# Patient Record
Sex: FEMALE | Race: WHITE | URBAN - METROPOLITAN AREA
[De-identification: names, ages, dates, MRNs, and addresses within clinical notes are randomized per-mention and may not be internally consistent; named-entity substitution may affect disease eponyms.]

---

## 2017-02-09 RX ORDER — LOSARTAN POTASSIUM 50 MG/1
50 TABLET ORAL DAILY
Qty: 30 TABLET | Refills: 6 | Status: SHIPPED | OUTPATIENT
Start: 2017-02-09 | End: 2017-03-14 | Stop reason: SDUPTHER

## 2017-02-22 RX ORDER — HYDROCHLOROTHIAZIDE 25 MG/1
TABLET ORAL
Qty: 30 TABLET | Refills: 5 | Status: SHIPPED | OUTPATIENT
Start: 2017-02-22 | End: 2017-04-19

## 2017-03-14 RX ORDER — LOSARTAN POTASSIUM 50 MG/1
50 TABLET ORAL DAILY
Qty: 30 TABLET | Refills: 6 | Status: SHIPPED | OUTPATIENT
Start: 2017-03-14 | End: 2017-03-16 | Stop reason: SDUPTHER

## 2017-03-16 ENCOUNTER — TELEPHONE (OUTPATIENT)
Dept: CARDIOLOGY CLINIC | Age: 82
End: 2017-03-16

## 2017-03-16 RX ORDER — LOSARTAN POTASSIUM 50 MG/1
50 TABLET ORAL DAILY
Qty: 90 TABLET | Refills: 3 | Status: SHIPPED | OUTPATIENT
Start: 2017-03-16 | End: 2017-03-17 | Stop reason: SDUPTHER

## 2017-03-17 RX ORDER — LOSARTAN POTASSIUM 50 MG/1
50 TABLET ORAL DAILY
Qty: 90 TABLET | Refills: 3 | Status: SHIPPED | OUTPATIENT
Start: 2017-03-17 | End: 2017-04-19 | Stop reason: SDUPTHER

## 2017-04-12 RX ORDER — PRAVASTATIN SODIUM 40 MG
TABLET ORAL
Qty: 30 TABLET | Refills: 6 | Status: SHIPPED | OUTPATIENT
Start: 2017-04-12 | End: 2017-04-19

## 2017-04-12 RX ORDER — ATENOLOL 100 MG/1
TABLET ORAL
Qty: 30 TABLET | Refills: 6 | Status: SHIPPED | OUTPATIENT
Start: 2017-04-12 | End: 2018-07-25 | Stop reason: SDUPTHER

## 2017-04-19 ENCOUNTER — OFFICE VISIT (OUTPATIENT)
Dept: CARDIOLOGY CLINIC | Age: 82
End: 2017-04-19

## 2017-04-19 VITALS
SYSTOLIC BLOOD PRESSURE: 128 MMHG | WEIGHT: 108 LBS | HEIGHT: 63 IN | BODY MASS INDEX: 19.14 KG/M2 | DIASTOLIC BLOOD PRESSURE: 72 MMHG | HEART RATE: 62 BPM

## 2017-04-19 DIAGNOSIS — E78.49 OTHER HYPERLIPIDEMIA: Primary | ICD-10-CM

## 2017-04-19 DIAGNOSIS — I25.10 ATHEROSCLEROSIS OF NATIVE CORONARY ARTERY OF NATIVE HEART WITHOUT ANGINA PECTORIS: ICD-10-CM

## 2017-04-19 DIAGNOSIS — I65.23 BILATERAL CAROTID ARTERY STENOSIS: ICD-10-CM

## 2017-04-19 DIAGNOSIS — I10 ESSENTIAL HYPERTENSION, BENIGN: ICD-10-CM

## 2017-04-19 DIAGNOSIS — R07.9 CHEST PAIN, UNSPECIFIED TYPE: ICD-10-CM

## 2017-04-19 PROCEDURE — 99214 OFFICE O/P EST MOD 30 MIN: CPT | Performed by: INTERNAL MEDICINE

## 2017-04-19 RX ORDER — DONEPEZIL HYDROCHLORIDE 10 MG/1
10 TABLET, FILM COATED ORAL NIGHTLY
COMMUNITY

## 2017-04-19 RX ORDER — LOSARTAN POTASSIUM 25 MG/1
25 TABLET ORAL DAILY
Qty: 90 TABLET | Refills: 3 | Status: SHIPPED | OUTPATIENT
Start: 2017-04-19 | End: 2018-07-25 | Stop reason: SDUPTHER

## 2017-04-19 RX ORDER — NITROGLYCERIN 0.4 MG/1
0.4 TABLET SUBLINGUAL EVERY 5 MIN PRN
Qty: 25 TABLET | Refills: 1 | Status: SHIPPED | OUTPATIENT
Start: 2017-04-19

## 2018-01-08 ENCOUNTER — OFFICE VISIT (OUTPATIENT)
Dept: CARDIOLOGY CLINIC | Age: 83
End: 2018-01-08

## 2018-01-08 VITALS
WEIGHT: 107 LBS | DIASTOLIC BLOOD PRESSURE: 72 MMHG | BODY MASS INDEX: 19.69 KG/M2 | SYSTOLIC BLOOD PRESSURE: 132 MMHG | HEART RATE: 66 BPM | HEIGHT: 62 IN

## 2018-01-08 DIAGNOSIS — E78.49 OTHER HYPERLIPIDEMIA: ICD-10-CM

## 2018-01-08 DIAGNOSIS — I10 ESSENTIAL HYPERTENSION, BENIGN: Primary | ICD-10-CM

## 2018-01-08 DIAGNOSIS — I25.10 ATHEROSCLEROSIS OF NATIVE CORONARY ARTERY OF NATIVE HEART WITHOUT ANGINA PECTORIS: ICD-10-CM

## 2018-01-08 PROCEDURE — G8598 ASA/ANTIPLAT THER USED: HCPCS | Performed by: INTERNAL MEDICINE

## 2018-01-08 PROCEDURE — G8484 FLU IMMUNIZE NO ADMIN: HCPCS | Performed by: INTERNAL MEDICINE

## 2018-01-08 PROCEDURE — 99214 OFFICE O/P EST MOD 30 MIN: CPT | Performed by: INTERNAL MEDICINE

## 2018-01-08 PROCEDURE — 4040F PNEUMOC VAC/ADMIN/RCVD: CPT | Performed by: INTERNAL MEDICINE

## 2018-01-08 PROCEDURE — 93000 ELECTROCARDIOGRAM COMPLETE: CPT | Performed by: INTERNAL MEDICINE

## 2018-01-08 PROCEDURE — 1123F ACP DISCUSS/DSCN MKR DOCD: CPT | Performed by: INTERNAL MEDICINE

## 2018-01-08 PROCEDURE — 1090F PRES/ABSN URINE INCON ASSESS: CPT | Performed by: INTERNAL MEDICINE

## 2018-01-08 PROCEDURE — G8420 CALC BMI NORM PARAMETERS: HCPCS | Performed by: INTERNAL MEDICINE

## 2018-01-08 PROCEDURE — 1036F TOBACCO NON-USER: CPT | Performed by: INTERNAL MEDICINE

## 2018-01-08 PROCEDURE — G8427 DOCREV CUR MEDS BY ELIG CLIN: HCPCS | Performed by: INTERNAL MEDICINE

## 2018-01-08 NOTE — LETTER
has a past medical history of CAD (coronary artery disease); Hyperlipidemia; Hypertension; and Thyroid disease. Parkinsons disease without tremor    Surgical History:   has a past surgical history that includes bladder repair; Hysterectomy; Cholecystectomy; Foot surgery; and Rectocele repair. Social History:  History     Social History    Marital Status:       Spouse Name: N/A     Number of Children: N/A    Years of Education: N/A     Occupational History    Not on file. Social History Main Topics    Smoking status: Never Smoker     Smokeless tobacco: Not on file    Alcohol Use: No    Drug Use: No    Sexually Active:            Family History:  family history is not on file. Allergies:  Review of patient's allergies indicates no known allergies. Review of Systems:   · Constitutional: there has been no unanticipated weight loss. No change in energy or activity level   · Eyes: No visual changes   · ENT: No Headaches, hearing loss or vertigo. No mouth sores or sore throat. · Cardiovascular: Reviewed in HPI  · Respiratory: No cough or wheezing, no sputum production. · Gastrointestinal: No abdominal pain, appetite loss, blood in stools. · Genitourinary: No nocturia, dysuria, trouble voiding  · Musculoskeletal:  No gait disturbance, weakness or joint complaints. · Integumentary: No rash or pruritis. · Neurological: No headache, change in muscle strength, numbness or tingling. No change in gait, balance, coordination, mood, affect, memory, mentation, behavior. + dizziness  · Psychiatric: No anxiety or depression  · Endocrine: No malaise or fever  · Hematologic/Lymphatic: No abnormal bruising or bleeding, blood clots or swollen lymph nodes. · Allergic/Immunologic: No nasal congestion or hives.     Physical Examination:    Vitals:    01/08/18 1350   BP: 132/72   Site: Left Arm   Position: Sitting   Cuff Size: Medium Adult   Pulse: 66   Weight: 107 lb (48.5 kg)

## 2018-01-17 NOTE — COMMUNICATION BODY
Topics    Smoking status: Never Smoker     Smokeless tobacco: Not on file    Alcohol Use: No    Drug Use: No    Sexually Active:            Family History:  family history is not on file. Allergies:  Review of patient's allergies indicates no known allergies. Review of Systems:   · Constitutional: there has been no unanticipated weight loss. No change in energy or activity level   · Eyes: No visual changes   · ENT: No Headaches, hearing loss or vertigo. No mouth sores or sore throat. · Cardiovascular: Reviewed in HPI  · Respiratory: No cough or wheezing, no sputum production. · Gastrointestinal: No abdominal pain, appetite loss, blood in stools. · Genitourinary: No nocturia, dysuria, trouble voiding  · Musculoskeletal:  No gait disturbance, weakness or joint complaints. · Integumentary: No rash or pruritis. · Neurological: No headache, change in muscle strength, numbness or tingling. No change in gait, balance, coordination, mood, affect, memory, mentation, behavior. + dizziness  · Psychiatric: No anxiety or depression  · Endocrine: No malaise or fever  · Hematologic/Lymphatic: No abnormal bruising or bleeding, blood clots or swollen lymph nodes. · Allergic/Immunologic: No nasal congestion or hives.     Physical Examination:    Vitals:    01/08/18 1350   BP: 132/72   Site: Left Arm   Position: Sitting   Cuff Size: Medium Adult   Pulse: 66   Weight: 107 lb (48.5 kg)   Height: 5' 2\" (1.575 m)     Wt Readings from Last 3 Encounters:   01/08/18 107 lb (48.5 kg)   04/19/17 108 lb (49 kg)   10/03/16 110 lb (49.9 kg)     BP Readings from Last 3 Encounters:   01/08/18 132/72   04/19/17 128/72   10/03/16 160/74     Constitutional and General Appearance:  appears stated age  Respiratory:  · Normal excursion and expansion without use of accessory muscles  · Resp Auscultation: Normal breath sounds without dullness  Cardiovascular:  · The apical impulses not displaced  · Heart is regular rate and rhythm

## 2018-07-25 ENCOUNTER — OFFICE VISIT (OUTPATIENT)
Dept: CARDIOLOGY CLINIC | Age: 83
End: 2018-07-25

## 2018-07-25 VITALS
HEIGHT: 62 IN | HEART RATE: 68 BPM | SYSTOLIC BLOOD PRESSURE: 180 MMHG | WEIGHT: 104 LBS | DIASTOLIC BLOOD PRESSURE: 70 MMHG | BODY MASS INDEX: 19.14 KG/M2

## 2018-07-25 DIAGNOSIS — E78.49 OTHER HYPERLIPIDEMIA: ICD-10-CM

## 2018-07-25 DIAGNOSIS — I25.10 ATHEROSCLEROSIS OF NATIVE CORONARY ARTERY OF NATIVE HEART WITHOUT ANGINA PECTORIS: Primary | ICD-10-CM

## 2018-07-25 DIAGNOSIS — I10 ESSENTIAL HYPERTENSION, BENIGN: ICD-10-CM

## 2018-07-25 PROCEDURE — G8427 DOCREV CUR MEDS BY ELIG CLIN: HCPCS | Performed by: INTERNAL MEDICINE

## 2018-07-25 PROCEDURE — G8420 CALC BMI NORM PARAMETERS: HCPCS | Performed by: INTERNAL MEDICINE

## 2018-07-25 PROCEDURE — 99214 OFFICE O/P EST MOD 30 MIN: CPT | Performed by: INTERNAL MEDICINE

## 2018-07-25 PROCEDURE — 1101F PT FALLS ASSESS-DOCD LE1/YR: CPT | Performed by: INTERNAL MEDICINE

## 2018-07-25 PROCEDURE — 1036F TOBACCO NON-USER: CPT | Performed by: INTERNAL MEDICINE

## 2018-07-25 PROCEDURE — 4040F PNEUMOC VAC/ADMIN/RCVD: CPT | Performed by: INTERNAL MEDICINE

## 2018-07-25 PROCEDURE — 1090F PRES/ABSN URINE INCON ASSESS: CPT | Performed by: INTERNAL MEDICINE

## 2018-07-25 PROCEDURE — G8598 ASA/ANTIPLAT THER USED: HCPCS | Performed by: INTERNAL MEDICINE

## 2018-07-25 PROCEDURE — 1123F ACP DISCUSS/DSCN MKR DOCD: CPT | Performed by: INTERNAL MEDICINE

## 2018-07-25 RX ORDER — LOSARTAN POTASSIUM 50 MG/1
50 TABLET ORAL DAILY
Qty: 90 TABLET | Refills: 3 | Status: SHIPPED | OUTPATIENT
Start: 2018-07-25

## 2018-07-25 RX ORDER — ATENOLOL 100 MG/1
50 TABLET ORAL 2 TIMES DAILY
Qty: 180 TABLET | Refills: 3 | Status: SHIPPED | OUTPATIENT
Start: 2018-07-25 | End: 2020-02-26 | Stop reason: SDUPTHER

## 2018-07-25 NOTE — LETTER
[address] Cardiology [facility]  [address] [place]. Soledad Nguyen [place] [address]  Phone: [phone]  Fax: [phone]    Jeremi Bal MD        July 30, 2018     MD [id number]  [address] [address]    Patient: Donna Williamson  MR Number: [id number]  YOB: 1926  Date of Visit: 7/25/2018    Dear Dr. Ceferino Em:    Thank you for the request for consultation for [place] to me for the evaluation of Ms Iliana Chow. Below are the relevant portions of my assessment and plan of care. [facility]   Cardiac Evaluation      Patient: Donna Williamson  YOB: 1926  Date: 07/25/18     Chief Complaint   Patient presents with    Coronary Artery Disease    Hypertension      Referring provider: Ceferino Em MD    History of Present Illness:   Ms. Iliana Chow is seen today in follow up of her hypertension. She had a cholecystectomy with subsequent ST depression on monitor and confirmation on EKG 12/12/12. Post-operatively she developed pressure like sensation in her chest according to report (she does not remember this). Cardiac enzymes were <0.01, 0.27, and 0.55. An echo and nuclear stress test were performed. Stress test revealed normal perfusion. Plavix was initiated and Atenolol was changed to Metoprolol. I then performed an angiogram which revealed stable findings. She has a history of coronary disease, hypertension, and hyperlipidemia. Her coronary disease has been medically managed since 1998. She decided to stop pravachol but restarted it at my insistence. She now lives at [facility] due to her dementia but does her own meds with her RN daughter's help. Today, Nasir Galicia presents with her daughter. Overall, she feels OK. She continues to use a cane for balance assist. Nasir Galicia denies exertional chest pain, PAGAN/PND, palpitations, edema. She is very social at [email or web address] where she lives and attends many activities. Weight: 104 lb (47.2 kg)    Height: 5' 2\" (1.575 m)      Wt Readings from Last 3 Encounters:   07/25/18 104 lb (47.2 kg)   01/08/18 107 lb (48.5 kg)   04/19/17 108 lb (49 kg)     BP Readings from Last 3 Encounters:   07/25/18 (!) 180/70   01/08/18 132/72   04/19/17 128/72     Constitutional and General Appearance:  appears stated age  Respiratory:  · Normal excursion and expansion without use of accessory muscles  · Resp Auscultation: Normal breath sounds without dullness  Cardiovascular:  · The apical impulses not displaced  · Heart is regular rate and rhythm with normal S1, S2  2/6 LIZ  · The carotid upstroke is normal, no bruit noted   · JVP is not elevated  · Peripheral pulses are symmetrical  · There is no clubbing, cyanosis of the extremities  · No edema  · Femoral Arteries: 2+ and equal  · Pedal Pulses: 2+ and equal   Abdomen:  · No masses or tenderness  · Normal bowel sounds  Neurological/Psychiatric:  · Alert and oriented x3  · Moves all extremities well  · Exhibits normal gait balance and coordination    Assessment/Plan  1. Coronary artery Disease: Stable. Mild ST depression on EKG 6/13/16. Angiogram 12/28/12> LVEDP - 16, LVgram - normal WM with EF 65-70% Cors: LM-normal,  LAD - prox and mid calcification with irreg and 40% bifurcation lesion at Dx3   Lcx - nl with a large collateral to the distal RCA filling the PDA and large PL branch   RCA - serial 90% lesions of the proximal vessel with total occlusion at the AM branch. Distal vessel fills by L to R collaterals mainly from the distal Lcx. Lexiscan lani 12/14/12> Normal perfusion with EF 91% [facility] [address]  Lexiscan lani 10/08> Normal myocardial perfusion with EF 87%  Angiogram 1998> 1 vessel disease involving RCA   2. Other and unspecified hyperlipidemia: she should stay on pravachol.     3. Essential hypertension, benign: high today  Echo 12/14/12> EF 55-60%, mild MR, left atrium mildly dilated (performed at [address]) Echo 3/25/11> EF 60%, trace MR, left atrium 4.24cm, mild to moderate TR, RVSP 40mmHg   4. Other symptoms involving cardiovascular system: Stable  Carotid doppler 2/15> 16-49% bilateral  Carotid doppler 9/10> <40% bilaterally   5. Palpitations, h/o:  PAC's    Plan:  Increase Losartan 50mg daily, will ask nurses at [facility] to check blood pressure 3x/week with an arm cuff and fax readings weekly. . FU 4 months. Scribe's attestation: This note was scribed in the presence of Dr Valentin Schaeffer MD by Rafael Camilo RN. Thank you for allowing to me to participate in the care of Kaiden Paredes. If you have questions, please do not hesitate to call me. I look forward to following Adele Vazquez along with you.     Sincerely,        Liv Hutchinson MD

## 2018-07-25 NOTE — PROGRESS NOTES
Never Smoker     Smokeless tobacco: Not on file    Alcohol Use: No    Drug Use: No    Sexually Active:            Family History:  family history is not on file. Allergies:  Patient has no known allergies. Review of Systems:   · Constitutional: there has been no unanticipated weight loss. No change in energy or activity level   · Eyes: No visual changes   · ENT: No Headaches, hearing loss or vertigo. No mouth sores or sore throat. · Cardiovascular: Reviewed in HPI  · Respiratory: No cough or wheezing, no sputum production. · Gastrointestinal: No abdominal pain, appetite loss, blood in stools. · Genitourinary: No nocturia, dysuria, trouble voiding  · Musculoskeletal:  No gait disturbance, weakness or joint complaints. · Integumentary: No rash or pruritis. · Neurological: No headache, change in muscle strength, numbness or tingling. No change in gait, balance, coordination, mood, affect, memory, mentation, behavior. + dizziness  · Psychiatric: No anxiety or depression  · Endocrine: No malaise or fever  · Hematologic/Lymphatic: No abnormal bruising or bleeding, blood clots or swollen lymph nodes. · Allergic/Immunologic: No nasal congestion or hives.     Physical Examination:    Vitals:    07/25/18 1530 07/25/18 1534   BP: (!) 170/70 (!) 180/70   Site: Right Arm    Position: Sitting    Cuff Size: Medium Adult    Pulse: 68    Weight: 104 lb (47.2 kg)    Height: 5' 2\" (1.575 m)      Wt Readings from Last 3 Encounters:   07/25/18 104 lb (47.2 kg)   01/08/18 107 lb (48.5 kg)   04/19/17 108 lb (49 kg)     BP Readings from Last 3 Encounters:   07/25/18 (!) 180/70   01/08/18 132/72   04/19/17 128/72     Constitutional and General Appearance:  appears stated age  Respiratory:  · Normal excursion and expansion without use of accessory muscles  · Resp Auscultation: Normal breath sounds without dullness  Cardiovascular:  · The apical impulses not displaced  · Heart is regular rate and rhythm with normal

## 2018-07-30 NOTE — COMMUNICATION BODY
[address]   Cardiac Evaluation      Patient: Danielle Laureano  YOB: 1926  Date: 07/25/18     Chief Complaint   Patient presents with    Coronary Artery Disease    Hypertension      Referring provider: Jeannie Huff MD    History of Present Illness:   Ms. James Adler is seen today in follow up of her hypertension. She had a cholecystectomy with subsequent ST depression on monitor and confirmation on EKG 12/12/12. Post-operatively she developed pressure like sensation in her chest according to report (she does not remember this). Cardiac enzymes were <0.01, 0.27, and 0.55. An echo and nuclear stress test were performed. Stress test revealed normal perfusion. Plavix was initiated and Atenolol was changed to Metoprolol. I then performed an angiogram which revealed stable findings. She has a history of coronary disease, hypertension, and hyperlipidemia. Her coronary disease has been medically managed since 1998. She decided to stop pravachol but restarted it at my insistence. She now lives at [facility] due to her dementia but does her own meds with her RN daughter's help. Today, Vasile Ambriz presents with her daughter. Overall, she feels OK. She continues to use a cane for balance assist. Vasile Ambriz denies exertional chest pain, PAGAN/PND, palpitations, edema. She is very social at [employer] where she lives and attends many activities. Past Medical History:   has a past medical history of CAD (coronary artery disease); Hyperlipidemia; Hypertension; and Thyroid disease. Parkinsons disease without tremor    Surgical History:   has a past surgical history that includes bladder repair; Hysterectomy; Cholecystectomy; Foot surgery; and Rectocele repair. Social History:  History     Social History    Marital Status:       Spouse Name: N/A     Number of Children: N/A    Years of Education: N/A     Occupational History    Not on file.      Social History Main Topics    Smoking status: S1, S2  2/6 LIZ  · The carotid upstroke is normal, no bruit noted   · JVP is not elevated  · Peripheral pulses are symmetrical  · There is no clubbing, cyanosis of the extremities  · No edema  · Femoral Arteries: 2+ and equal  · Pedal Pulses: 2+ and equal   Abdomen:  · No masses or tenderness  · Normal bowel sounds  Neurological/Psychiatric:  · Alert and oriented x3  · Moves all extremities well  · Exhibits normal gait balance and coordination    Assessment/Plan  1. Coronary artery Disease: Stable. Mild ST depression on EKG 6/13/16. Angiogram 12/28/12> LVEDP - 16, LVgram - normal WM with EF 65-70% Cors: LM-normal,  LAD - prox and mid calcification with irreg and 40% bifurcation lesion at Dx3   Lcx - nl with a large collateral to the distal RCA filling the PDA and large PL branch   RCA - serial 90% lesions of the proximal vessel with total occlusion at the AM branch. Distal vessel fills by L to R collaterals mainly from the distal Lcx. Lexiscan lani 12/14/12> Normal perfusion with EF 91% (performed at [facility])  Lexiscan lani 10/08> Normal myocardial perfusion with EF 87%  Angiogram 1998> 1 vessel disease involving RCA   2. Other and unspecified hyperlipidemia: she should stay on pravachol. 3. Essential hypertension, benign: high today  Echo 12/14/12> EF 55-60%, mild MR, left atrium mildly dilated (performed at [facility])  Echo 3/25/11> EF 60%, trace MR, left atrium 4.24cm, mild to moderate TR, RVSP 40mmHg   4. Other symptoms involving cardiovascular system: Stable  Carotid doppler 2/15> 16-49% bilateral  Carotid doppler 9/10> <40% bilaterally   5. Palpitations, h/o:  PAC's    Plan:  Increase Losartan 50mg daily, will ask nurses at [facility] to check blood pressure 3x/week with an arm cuff and fax readings weekly. . FU 4 months. Scribe's attestation: This note was scribed in the presence of Dr Jaja Oliveira MD by David Ferreira, [facility]MAD. Thank you for allowing to me to participate in the care of Helio Barnett.

## 2018-11-28 ENCOUNTER — OFFICE VISIT (OUTPATIENT)
Dept: CARDIOLOGY CLINIC | Age: 83
End: 2018-11-28
Payer: MEDICARE

## 2018-11-28 VITALS
BODY MASS INDEX: 20.24 KG/M2 | DIASTOLIC BLOOD PRESSURE: 84 MMHG | SYSTOLIC BLOOD PRESSURE: 164 MMHG | WEIGHT: 110 LBS | HEIGHT: 62 IN | HEART RATE: 56 BPM

## 2018-11-28 DIAGNOSIS — I25.10 ATHEROSCLEROSIS OF NATIVE CORONARY ARTERY OF NATIVE HEART WITHOUT ANGINA PECTORIS: Primary | ICD-10-CM

## 2018-11-28 DIAGNOSIS — E78.49 OTHER HYPERLIPIDEMIA: ICD-10-CM

## 2018-11-28 DIAGNOSIS — I10 ESSENTIAL HYPERTENSION, BENIGN: ICD-10-CM

## 2018-11-28 PROCEDURE — 1101F PT FALLS ASSESS-DOCD LE1/YR: CPT | Performed by: INTERNAL MEDICINE

## 2018-11-28 PROCEDURE — 4040F PNEUMOC VAC/ADMIN/RCVD: CPT | Performed by: INTERNAL MEDICINE

## 2018-11-28 PROCEDURE — 99214 OFFICE O/P EST MOD 30 MIN: CPT | Performed by: INTERNAL MEDICINE

## 2018-11-28 PROCEDURE — 1090F PRES/ABSN URINE INCON ASSESS: CPT | Performed by: INTERNAL MEDICINE

## 2018-11-28 PROCEDURE — G8484 FLU IMMUNIZE NO ADMIN: HCPCS | Performed by: INTERNAL MEDICINE

## 2018-11-28 PROCEDURE — G8427 DOCREV CUR MEDS BY ELIG CLIN: HCPCS | Performed by: INTERNAL MEDICINE

## 2018-11-28 PROCEDURE — 1036F TOBACCO NON-USER: CPT | Performed by: INTERNAL MEDICINE

## 2018-11-28 PROCEDURE — G8598 ASA/ANTIPLAT THER USED: HCPCS | Performed by: INTERNAL MEDICINE

## 2018-11-28 PROCEDURE — 1123F ACP DISCUSS/DSCN MKR DOCD: CPT | Performed by: INTERNAL MEDICINE

## 2018-11-28 PROCEDURE — G8420 CALC BMI NORM PARAMETERS: HCPCS | Performed by: INTERNAL MEDICINE

## 2018-12-04 NOTE — COMMUNICATION BODY
[address]   Cardiac Evaluation      Patient: Rory Rosa  YOB: 1926  Date: 11/28/18     No chief complaint on file. Referring provider: ANTHONY Box CNP    History of Present Illness:   Ms. Randee Mcdaniel is seen today in follow up of her hypertension. She had a cholecystectomy with subsequent ST depression on monitor and confirmation on EKG 12/12/12. Post-operatively she developed pressure like sensation in her chest according to report (she does not remember this). Cardiac enzymes were <0.01, 0.27, and 0.55. An echo and nuclear stress test were performed. Stress test revealed normal perfusion. Plavix was initiated and Atenolol was changed to Metoprolol. I then performed an angiogram which revealed stable findings. She has a history of coronary disease, hypertension, and hyperlipidemia. Her coronary disease has been medically managed since 1998. She decided to stop pravachol but restarted it at my insistence. She lives at [facility] due to her dementia but does her own meds with her RN daughter's help. Today, Gerardo Hauser presents with her daughter. Overall, she feels well. She continues to use a cane or walker for balance assist. Gerardo Hauser denies exertional chest pain, PAGAN/PND, palpitations, edema. She is very social at [employer] where she lives and attends activities. Past Medical History:   has a past medical history of CAD (coronary artery disease); Hyperlipidemia; Hypertension; and Thyroid disease. Parkinsons disease without tremor    Surgical History:   has a past surgical history that includes bladder repair; Hysterectomy; Cholecystectomy; Foot surgery; and Rectocele repair. Social History:  History     Social History    Marital Status:       Spouse Name: N/A     Number of Children: N/A    Years of Education: N/A     Occupational History    Not on file.      Social History Main Topics    Smoking status: Never Smoker     Smokeless tobacco: Not on file    Alcohol Use: No    Drug Use: No    Sexually Active:            Family History:  family history is not on file. Allergies:  Patient has no known allergies. Review of Systems:   · Constitutional: there has been no unanticipated weight loss. No change in energy or activity level   · Eyes: No visual changes   · ENT: No Headaches, hearing loss or vertigo. No mouth sores or sore throat. · Cardiovascular: Reviewed in HPI  · Respiratory: No cough or wheezing, no sputum production. · Gastrointestinal: No abdominal pain, appetite loss, blood in stools. · Genitourinary: No nocturia, dysuria, trouble voiding  · Musculoskeletal:  No gait disturbance, weakness or joint complaints. · Integumentary: No rash or pruritis. · Neurological: No headache, change in muscle strength, numbness or tingling. No change in gait, balance, coordination, mood, affect, memory, mentation, behavior  · Psychiatric: No anxiety or depression  · Endocrine: No malaise or fever  · Hematologic/Lymphatic: No abnormal bruising or bleeding, blood clots or swollen lymph nodes. · Allergic/Immunologic: No nasal congestion or hives.     Physical Examination:    Vitals:    11/28/18 1534 11/28/18 1537   BP: (!) 164/84 (!) 164/84   Site: Left Upper Arm    Position: Sitting    Cuff Size: Medium Adult    Pulse: 56    Weight: 110 lb (49.9 kg)    Height: 5' 2\" (1.575 m)      Wt Readings from Last 3 Encounters:   11/28/18 110 lb (49.9 kg)   07/25/18 104 lb (47.2 kg)   01/08/18 107 lb (48.5 kg)     BP Readings from Last 3 Encounters:   11/28/18 (!) 164/84   07/25/18 (!) 180/70   01/08/18 132/72     Constitutional and General Appearance:  appears stated age  Respiratory:  · Normal excursion and expansion without use of accessory muscles  · Resp Auscultation: Normal breath sounds without dullness  Cardiovascular:  · The apical impulses not displaced  · Heart is regular rate and rhythm with normal S1, S2  2/6 LIZ  · The carotid upstroke is

## 2019-05-15 ENCOUNTER — OFFICE VISIT (OUTPATIENT)
Dept: CARDIOLOGY CLINIC | Age: 84
End: 2019-05-15
Payer: MEDICARE

## 2019-05-15 VITALS
DIASTOLIC BLOOD PRESSURE: 80 MMHG | HEART RATE: 65 BPM | SYSTOLIC BLOOD PRESSURE: 170 MMHG | BODY MASS INDEX: 19.88 KG/M2 | OXYGEN SATURATION: 98 % | HEIGHT: 62 IN | WEIGHT: 108 LBS

## 2019-05-15 DIAGNOSIS — E78.49 OTHER HYPERLIPIDEMIA: Primary | ICD-10-CM

## 2019-05-15 DIAGNOSIS — I10 ESSENTIAL HYPERTENSION, BENIGN: ICD-10-CM

## 2019-05-15 DIAGNOSIS — I25.10 ATHEROSCLEROSIS OF NATIVE CORONARY ARTERY OF NATIVE HEART WITHOUT ANGINA PECTORIS: ICD-10-CM

## 2019-05-15 PROCEDURE — 99214 OFFICE O/P EST MOD 30 MIN: CPT | Performed by: INTERNAL MEDICINE

## 2019-05-15 PROCEDURE — G8427 DOCREV CUR MEDS BY ELIG CLIN: HCPCS | Performed by: INTERNAL MEDICINE

## 2019-05-15 PROCEDURE — G8420 CALC BMI NORM PARAMETERS: HCPCS | Performed by: INTERNAL MEDICINE

## 2019-05-15 PROCEDURE — 1123F ACP DISCUSS/DSCN MKR DOCD: CPT | Performed by: INTERNAL MEDICINE

## 2019-05-15 PROCEDURE — 4040F PNEUMOC VAC/ADMIN/RCVD: CPT | Performed by: INTERNAL MEDICINE

## 2019-05-15 PROCEDURE — 1090F PRES/ABSN URINE INCON ASSESS: CPT | Performed by: INTERNAL MEDICINE

## 2019-05-15 PROCEDURE — G8598 ASA/ANTIPLAT THER USED: HCPCS | Performed by: INTERNAL MEDICINE

## 2019-05-15 PROCEDURE — 1036F TOBACCO NON-USER: CPT | Performed by: INTERNAL MEDICINE

## 2019-05-15 RX ORDER — UBIDECARENONE 75 MG
50 CAPSULE ORAL DAILY
COMMUNITY

## 2019-05-15 RX ORDER — MULTIVIT-MIN/IRON/FOLIC ACID/K 18-600-40
CAPSULE ORAL
COMMUNITY

## 2019-05-15 RX ORDER — PRAVASTATIN SODIUM 40 MG
40 TABLET ORAL DAILY
Qty: 30 TABLET | Refills: 6 | Status: SHIPPED | OUTPATIENT
Start: 2019-05-15 | End: 2020-02-26 | Stop reason: SDUPTHER

## 2019-05-15 NOTE — LETTER
Cholecystectomy; Foot surgery; and Rectocele repair. Social History:  History     Social History    Marital Status:       Spouse Name: N/A     Number of Children: N/A    Years of Education: N/A     Occupational History    Not on file. Social History Main Topics    Smoking status: Never Smoker     Smokeless tobacco: Not on file    Alcohol Use: No    Drug Use: No    Sexually Active:            Family History:  family history is not on file. Allergies:  Patient has no known allergies. Review of Systems:   · Constitutional: there has been no unanticipated weight loss. No change in energy or activity level   · Eyes: No visual changes   · ENT: No Headaches, hearing loss or vertigo. No mouth sores or sore throat. · Cardiovascular: Reviewed in HPI  · Respiratory: No cough or wheezing, no sputum production. · Gastrointestinal: No abdominal pain, appetite loss, blood in stools. · Genitourinary: No nocturia, dysuria, trouble voiding  · Musculoskeletal:  No gait disturbance, weakness or joint complaints. · Integumentary: No rash or pruritis. · Neurological: No headache, change in muscle strength, numbness or tingling. No change in gait, balance, coordination, mood, affect, memory, mentation, behavior  · Psychiatric: No anxiety or depression  · Endocrine: No malaise or fever  · Hematologic/Lymphatic: No abnormal bruising or bleeding, blood clots or swollen lymph nodes. · Allergic/Immunologic: No nasal congestion or hives.     Physical Examination:    Vitals:    05/15/19 1523 05/15/19 1529   BP: (!) 160/70 (!) 170/80   Site: Right Upper Arm Right Upper Arm   Position: Sitting Sitting   Cuff Size: Medium Adult Medium Adult   Pulse: 65    SpO2: 98%    Weight: 108 lb (49 kg)    Height: 5' 2\" (1.575 m)      Wt Readings from Last 3 Encounters:   05/15/19 108 lb (49 kg)   11/28/18 110 lb (49.9 kg)   07/25/18 104 lb (47.2 kg)     BP Readings from Last 3 Encounters:   05/15/19 (!) 170/80 Carotid doppler 9/10> <40% bilaterally   5. Palpitations, h/o:  PAC's    Plan: Adamaris Hansen appears stable. I explained to Adamaris Hansen and her daughter that she would benefit from a statin. Will restart Pravachol 40mg daily. Encouraged to continue to use walker/cane for assist. FU 6 months. Scribe's attestation: This note was scribed in the presence of Dr Tim Ralph MD by Ivana Lara RN. The scribe's documentation has been prepared under my direction and personally reviewed by me in its entirety. I confirm that the note above accurately reflects all work, treatment, procedures, and medical decision making performed by me. Thank you for allowing to me to participate in the care of Herve Gonsales. If you have questions, please do not hesitate to call me. I look forward to following Adamaris Hansen along with you.     Sincerely,        Jeremy Hutchinson MD

## 2019-05-15 NOTE — PROGRESS NOTES
[facility]   Cardiac Evaluation      Patient: Christiana Crigler  YOB: 1926  Date: 05/15/19     Chief Complaint   Patient presents with    Coronary Artery Disease    Hyperlipidemia    Hypertension      Referring provider: Brad Moralez MD    History of Present Illness:   Ms. Caitlin Millan is seen today in follow up of her hypertension. She had a cholecystectomy with subsequent ST depression on monitor and confirmation on EKG 12/12/12. Post-operatively she developed pressure like sensation in her chest according to report (she does not remember this). Cardiac enzymes were <0.01, 0.27, and 0.55. An echo and nuclear stress test were performed. Stress test revealed normal perfusion. Plavix was initiated and Atenolol was changed to Metoprolol. I then performed an angiogram which revealed stable findings. She has a history of coronary disease, hypertension, and hyperlipidemia. Her coronary disease has been medically managed since 1998. She decided to stop pravachol but restarted it at my insistence. She lives at [facility] due to her dementia but does her own meds with her RN daughter's help. Today, Smita Duarte presents with her daughter. She continues to use a cane or walker for balance assist. Smita Duarte denies exertional chest pain, PAGAN/PND, palpitations, edema. Past Medical History:   has a past medical history of CAD (coronary artery disease), Hyperlipidemia, Hypertension, and Thyroid disease. Parkinsons disease without tremor    Surgical History:   has a past surgical history that includes bladder repair; Hysterectomy; Cholecystectomy; Foot surgery; and Rectocele repair. Social History:  History     Social History    Marital Status:       Spouse Name: N/A     Number of Children: N/A    Years of Education: N/A     Occupational History    Not on file.      Social History Main Topics    Smoking status: Never Smoker     Smokeless tobacco: Not on file    Alcohol Use: No    Drug documentation has been prepared under my direction and personally reviewed by me in its entirety. I confirm that the note above accurately reflects all work, treatment, procedures, and medical decision making performed by me. Thank you for allowing to me to participate in the care of Jaja Odonnell.

## 2019-05-21 NOTE — COMMUNICATION BODY
[address]   Cardiac Evaluation      Patient: Padmini Antonio  YOB: 1926  Date: 05/15/19     Chief Complaint   Patient presents with    Coronary Artery Disease    Hyperlipidemia    Hypertension      Referring provider: Pepito Joshua MD    History of Present Illness:   Ms. Caterina Casanova is seen today in follow up of her hypertension. She had a cholecystectomy with subsequent ST depression on monitor and confirmation on EKG 12/12/12. Post-operatively she developed pressure like sensation in her chest according to report (she does not remember this). Cardiac enzymes were <0.01, 0.27, and 0.55. An echo and nuclear stress test were performed. Stress test revealed normal perfusion. Plavix was initiated and Atenolol was changed to Metoprolol. I then performed an angiogram which revealed stable findings. She has a history of coronary disease, hypertension, and hyperlipidemia. Her coronary disease has been medically managed since 1998. She decided to stop pravachol but restarted it at my insistence. She lives at [facility] due to her dementia but does her own meds with her RN daughter's help. Today, Amrit Lobo presents with her daughter. She continues to use a cane or walker for balance assist. Amrit Lobo denies exertional chest pain, PAGAN/PND, palpitations, edema. Past Medical History:   has a past medical history of CAD (coronary artery disease), Hyperlipidemia, Hypertension, and Thyroid disease. Parkinsons disease without tremor    Surgical History:   has a past surgical history that includes bladder repair; Hysterectomy; Cholecystectomy; Foot surgery; and Rectocele repair. Social History:  History     Social History    Marital Status:       Spouse Name: N/A     Number of Children: N/A    Years of Education: N/A     Occupational History    Not on file.      Social History Main Topics    Smoking status: Never Smoker     Smokeless tobacco: Not on file    Alcohol Use: No    Drug Use: No    Sexually Active:            Family History:  family history is not on file. Allergies:  Patient has no known allergies. Review of Systems:   · Constitutional: there has been no unanticipated weight loss. No change in energy or activity level   · Eyes: No visual changes   · ENT: No Headaches, hearing loss or vertigo. No mouth sores or sore throat. · Cardiovascular: Reviewed in HPI  · Respiratory: No cough or wheezing, no sputum production. · Gastrointestinal: No abdominal pain, appetite loss, blood in stools. · Genitourinary: No nocturia, dysuria, trouble voiding  · Musculoskeletal:  No gait disturbance, weakness or joint complaints. · Integumentary: No rash or pruritis. · Neurological: No headache, change in muscle strength, numbness or tingling. No change in gait, balance, coordination, mood, affect, memory, mentation, behavior  · Psychiatric: No anxiety or depression  · Endocrine: No malaise or fever  · Hematologic/Lymphatic: No abnormal bruising or bleeding, blood clots or swollen lymph nodes. · Allergic/Immunologic: No nasal congestion or hives.     Physical Examination:    Vitals:    05/15/19 1523 05/15/19 1529   BP: (!) 160/70 (!) 170/80   Site: Right Upper Arm Right Upper Arm   Position: Sitting Sitting   Cuff Size: Medium Adult Medium Adult   Pulse: 65    SpO2: 98%    Weight: 108 lb (49 kg)    Height: 5' 2\" (1.575 m)      Wt Readings from Last 3 Encounters:   05/15/19 108 lb (49 kg)   11/28/18 110 lb (49.9 kg)   07/25/18 104 lb (47.2 kg)     BP Readings from Last 3 Encounters:   05/15/19 (!) 170/80   11/28/18 (!) 164/84   07/25/18 (!) 180/70     Constitutional and General Appearance:  appears stated age  Respiratory:  · Normal excursion and expansion without use of accessory muscles  · Resp Auscultation: Normal breath sounds without dullness  Cardiovascular:  · The apical impulses not displaced  · Heart is regular rate and rhythm with normal S1, S2  2/6 LIZ  · The carotid upstroke is normal, no bruit noted   · JVP is not elevated  · Peripheral pulses are symmetrical  · There is no clubbing, cyanosis of the extremities  · No edema  · Femoral Arteries: 2+ and equal  · Pedal Pulses: 2+ and equal   Abdomen:  · No masses or tenderness  · Normal bowel sounds  Neurological/Psychiatric:  · Alert and oriented x3  · Moves all extremities well  · Exhibits normal gait balance and coordination    Assessment/Plan  1. Coronary artery Disease: Stable  Angiogram 12/28/12> LVEDP - 16, LVgram - normal WM with EF 65-70% Cors: LM-normal,  LAD - prox and mid calcification with irreg and 40% bifurcation lesion at Dx3   Lcx - nl with a large collateral to the distal RCA filling the PDA and large PL branch   RCA - serial 90% lesions of the proximal vessel with total occlusion at the AM branch. Distal vessel fills by L to R collaterals mainly from the distal Lcx. Lexiscan lani 12/14/12> Normal perfusion with EF 91% (performed at [facility])  Lexiscan lani 10/08> Normal myocardial perfusion with EF 87%  Angiogram 1998> 1 vessel disease involving RCA   2. Other and unspecified hyperlipidemia: she needs to be on statin. Labs 1/19: ; TRIG 257; HDL 48;    3. Essential hypertension, benign: high here in the office, readings from home are controlled   Echo 12/14/12> EF 55-60%, mild MR, left atrium mildly dilated (performed at [facility])  Echo 3/25/11> EF 60%, trace MR, left atrium 4.24cm, mild to moderate TR, RVSP 40mmHg   4. Other symptoms involving cardiovascular system: Stable  Carotid doppler 2/15> 16-49% bilateral  Carotid doppler 9/10> <40% bilaterally   5. Palpitations, h/o:  PAC's    Plan: Joya Boyer appears stable. I explained to Joya Boyer and her daughter that she would benefit from a statin. Will restart Pravachol 40mg daily. Encouraged to continue to use walker/cane for assist. FU 6 months. Scribe's attestation: This note was scribed in the presence of Dr Elise Lin MD by Kriss Valdovinos RN. The scribe's documentation has been prepared under my direction and personally reviewed by me in its entirety. I confirm that the note above accurately reflects all work, treatment, procedures, and medical decision making performed by me. Thank you for allowing to me to participate in the care of Elliot Cruz.

## 2019-11-20 ENCOUNTER — OFFICE VISIT (OUTPATIENT)
Dept: CARDIOLOGY CLINIC | Age: 84
End: 2019-11-20
Payer: MEDICARE

## 2019-11-20 VITALS
SYSTOLIC BLOOD PRESSURE: 124 MMHG | WEIGHT: 110 LBS | HEART RATE: 63 BPM | OXYGEN SATURATION: 98 % | HEIGHT: 62 IN | DIASTOLIC BLOOD PRESSURE: 70 MMHG | BODY MASS INDEX: 20.24 KG/M2

## 2019-11-20 DIAGNOSIS — I25.10 ATHEROSCLEROSIS OF NATIVE CORONARY ARTERY OF NATIVE HEART WITHOUT ANGINA PECTORIS: Primary | ICD-10-CM

## 2019-11-20 DIAGNOSIS — I10 ESSENTIAL HYPERTENSION, BENIGN: ICD-10-CM

## 2019-11-20 DIAGNOSIS — E78.49 OTHER HYPERLIPIDEMIA: ICD-10-CM

## 2019-11-20 PROCEDURE — 99214 OFFICE O/P EST MOD 30 MIN: CPT | Performed by: INTERNAL MEDICINE

## 2019-11-20 PROCEDURE — G8598 ASA/ANTIPLAT THER USED: HCPCS | Performed by: INTERNAL MEDICINE

## 2019-11-20 PROCEDURE — G8427 DOCREV CUR MEDS BY ELIG CLIN: HCPCS | Performed by: INTERNAL MEDICINE

## 2019-11-20 PROCEDURE — G8420 CALC BMI NORM PARAMETERS: HCPCS | Performed by: INTERNAL MEDICINE

## 2019-11-20 PROCEDURE — G8484 FLU IMMUNIZE NO ADMIN: HCPCS | Performed by: INTERNAL MEDICINE

## 2019-11-20 PROCEDURE — 1123F ACP DISCUSS/DSCN MKR DOCD: CPT | Performed by: INTERNAL MEDICINE

## 2019-11-20 PROCEDURE — 4040F PNEUMOC VAC/ADMIN/RCVD: CPT | Performed by: INTERNAL MEDICINE

## 2019-11-20 PROCEDURE — 1090F PRES/ABSN URINE INCON ASSESS: CPT | Performed by: INTERNAL MEDICINE

## 2019-11-20 PROCEDURE — 1036F TOBACCO NON-USER: CPT | Performed by: INTERNAL MEDICINE

## 2019-11-20 RX ORDER — ACETAMINOPHEN 325 MG/1
1000 TABLET ORAL
COMMUNITY

## 2020-02-28 RX ORDER — ATENOLOL 100 MG/1
50 TABLET ORAL 2 TIMES DAILY
Qty: 90 TABLET | Refills: 3 | Status: SHIPPED | OUTPATIENT
Start: 2020-02-28

## 2020-02-28 RX ORDER — PRAVASTATIN SODIUM 40 MG
40 TABLET ORAL DAILY
Qty: 90 TABLET | Refills: 3 | Status: SHIPPED | OUTPATIENT
Start: 2020-02-28

## 2020-03-05 ENCOUNTER — TELEPHONE (OUTPATIENT)
Dept: INTERNAL MEDICINE CLINIC | Age: 85
End: 2020-03-05

## 2020-03-06 NOTE — TELEPHONE ENCOUNTER
Pt notified. She is taking sinemet  mg 3 times daily. [facility] [address] in [place]. Pt is having no issues other than getting this medication.

## 2020-03-06 NOTE — TELEPHONE ENCOUNTER
As long as she is doing well, I am happy to refill her medications for her until her OV. If she would like to be seen sooner, I don't know what my schedule looks like but we can check.  saw